# Patient Record
Sex: FEMALE | Race: WHITE | NOT HISPANIC OR LATINO | Employment: OTHER | URBAN - NONMETROPOLITAN AREA
[De-identification: names, ages, dates, MRNs, and addresses within clinical notes are randomized per-mention and may not be internally consistent; named-entity substitution may affect disease eponyms.]

---

## 2022-09-14 NOTE — PROGRESS NOTES
YOB: 1955  Location: [facility]  Location Address: [address] [place] [address], [facility], [address] [place] [address]  Location Phone: [phone]    Chief Complaint   Patient presents with   • Skin Lesion       History of Present Illness  Jane Choi is a 67 y.o. female.  Jane Choi is here for evaluation of ENT complaints. The patient has had problems with skin lesions to face.   She states the areas have been present for quite some time, but she feels as if they have not always been raised. She does not feel as if the areas have gotten any larger.   These have not been biopsied                  Past Medical History:   Diagnosis Date   • Ankle fracture, bimalleolar, closed, left, initial encounter        Past Surgical History:   Procedure Laterality Date   • KNEE MENISCAL REPAIR     • TONSILLECTOMY         Outpatient Medications Marked as Taking for the 9/15/22 encounter (Office Visit) with Rick Overton MD   Medication Sig Dispense Refill   • amLODIPine (NORVASC) 2.5 MG tablet      • betamethasone dipropionate 0.05 % cream      • busPIRone (BUSPAR) 15 MG tablet      • calcium carbonate (OS-JAEL) 600 MG tablet Take 600 mg by mouth Daily.     • docusate sodium (COLACE) 250 MG capsule Take 250 mg by mouth Daily.     • ergocalciferol (ERGOCALCIFEROL) 1.25 MG (68706 UT) capsule Take 50,000 Units by mouth.     • levothyroxine (SYNTHROID, LEVOTHROID) 112 MCG tablet      • lisinopril (PRINIVIL,ZESTRIL) 40 MG tablet      • metoprolol succinate XL (TOPROL-XL) 100 MG 24 hr tablet      • multivitamin (MULTI-VITAMIN DAILY PO) Take  by mouth Daily.     • mupirocin (BACTROBAN) 2 % ointment      • naproxen sodium (ANAPROX) 550 MG tablet      • oxybutynin XL (DITROPAN-XL) 5 MG 24 hr tablet      • sennosides-docusate (senna-docusate sodium) 8.6-50 MG per tablet Take 1 tablet by mouth Daily.     • simvastatin (ZOCOR) 40 MG tablet      • traZODone (DESYREL) 150 MG tablet          Patient has no known  allergies.    History reviewed. No pertinent family history.    Social History     Socioeconomic History   • Marital status: Single   Tobacco Use   • Smoking status: Current Some Day Smoker     Packs/day: 0.50     Years: 15.00     Pack years: 7.50     Types: Cigarettes     Start date: 2/19/2005     Last attempt to quit: 9/14/2022   • Smokeless tobacco: Never Used   • Tobacco comment: I am considering quitting smoking. Just have not yet.   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    HENT:        Admits skin lesion      Eyes: Negative.    Respiratory: Negative.    Cardiovascular: Negative.    Endocrine: Negative.    Genitourinary: Negative.    Musculoskeletal: Negative.        Vitals:    09/15/22 1053   BP: (!) 194/96   Pulse: 59   Resp: 16   Temp: 98 °F (36.7 °C)       Body mass index is 34.54 kg/m².    Objective     Physical Exam  Vitals reviewed.   Constitutional:       Appearance: Normal appearance. She is obese.   HENT:      Head: Normocephalic.     Neurological:      Mental Status: She is alert.         Assessment & Plan   Diagnoses and all orders for this visit:    1. Skin lesion (Primary)    2. Nevus    risks vs benefits of in office procedure discussed patient wishes to proceed     * Surgery not found *  No orders of the defined types were placed in this encounter.    Return in about 4 weeks (around 10/13/2022) for in office procedure .       Patient Instructions   Discussion of skin lesion. Discussed risks, benefits, alternatives, and possible complications of excision of the skin lesion with reconstruction utilizing local tissue rearrangement, full-thickness skin grafting, or local interpolated flaps. Risks include, but are not limited too: bleeding,  infection, hematoma, recurrence, need for additional procedures, flap failure, cosmetic deformity. Patient understands risks and would like to proceed with surgery.  Alternatives include doing nothing.

## 2022-09-15 ENCOUNTER — OFFICE VISIT (OUTPATIENT)
Dept: OTOLARYNGOLOGY | Facility: CLINIC | Age: 67
End: 2022-09-15

## 2022-09-15 VITALS
RESPIRATION RATE: 16 BRPM | WEIGHT: 195 LBS | BODY MASS INDEX: 34.55 KG/M2 | HEIGHT: 63 IN | DIASTOLIC BLOOD PRESSURE: 96 MMHG | TEMPERATURE: 98 F | HEART RATE: 59 BPM | SYSTOLIC BLOOD PRESSURE: 194 MMHG

## 2022-09-15 DIAGNOSIS — L98.9 SKIN LESION: Primary | ICD-10-CM

## 2022-09-15 DIAGNOSIS — D22.9 NEVUS: ICD-10-CM

## 2022-09-15 PROCEDURE — 99203 OFFICE O/P NEW LOW 30 MIN: CPT | Performed by: NURSE PRACTITIONER

## 2022-09-15 RX ORDER — DOCUSATE SODIUM 250 MG
250 CAPSULE ORAL DAILY
COMMUNITY

## 2022-09-15 RX ORDER — LISINOPRIL 40 MG/1
TABLET ORAL
COMMUNITY
Start: 2022-09-01

## 2022-09-15 RX ORDER — DIPHENOXYLATE HYDROCHLORIDE AND ATROPINE SULFATE 2.5; .025 MG/1; MG/1
TABLET ORAL DAILY
COMMUNITY

## 2022-09-15 RX ORDER — PHENOL 1.4 %
600 AEROSOL, SPRAY (ML) MUCOUS MEMBRANE DAILY
COMMUNITY

## 2022-09-15 RX ORDER — TRAZODONE HYDROCHLORIDE 150 MG/1
TABLET ORAL
COMMUNITY
Start: 2022-08-19

## 2022-09-15 RX ORDER — SIMVASTATIN 40 MG
TABLET ORAL
COMMUNITY
Start: 2022-09-01

## 2022-09-15 RX ORDER — BUSPIRONE HYDROCHLORIDE 15 MG/1
TABLET ORAL
COMMUNITY
Start: 2022-08-29

## 2022-09-15 RX ORDER — LEVOTHYROXINE SODIUM 112 UG/1
TABLET ORAL
COMMUNITY
Start: 2022-09-06

## 2022-09-15 RX ORDER — AMOXICILLIN 250 MG
1 CAPSULE ORAL DAILY
COMMUNITY

## 2022-09-15 RX ORDER — AMLODIPINE BESYLATE 2.5 MG/1
TABLET ORAL
COMMUNITY
Start: 2022-08-31

## 2022-09-15 RX ORDER — METOPROLOL SUCCINATE 100 MG/1
TABLET, EXTENDED RELEASE ORAL
COMMUNITY
Start: 2022-09-01

## 2022-09-15 RX ORDER — NAPROXEN SODIUM 550 MG/1
TABLET ORAL
COMMUNITY
Start: 2022-08-19

## 2022-09-15 RX ORDER — BETAMETHASONE DIPROPIONATE 0.5 MG/G
CREAM TOPICAL
COMMUNITY
Start: 2022-07-26

## 2022-09-15 RX ORDER — OXYBUTYNIN CHLORIDE 5 MG/1
TABLET, EXTENDED RELEASE ORAL
COMMUNITY
Start: 2022-08-19

## 2022-09-15 RX ORDER — ERGOCALCIFEROL 1.25 MG/1
50000 CAPSULE ORAL
COMMUNITY

## 2022-09-15 NOTE — PATIENT INSTRUCTIONS
Discussion of skin lesion. Discussed risks, benefits, alternatives, and possible complications of excision of the skin lesion with reconstruction utilizing local tissue rearrangement, full-thickness skin grafting, or local interpolated flaps. Risks include, but are not limited too: bleeding, infection, hematoma, recurrence, need for additional procedures, flap failure, cosmetic deformity. Patient understands risks and would like to proceed with surgery.  Alternatives include doing nothing.

## 2022-10-27 ENCOUNTER — PROCEDURE VISIT (OUTPATIENT)
Dept: OTOLARYNGOLOGY | Facility: CLINIC | Age: 67
End: 2022-10-27

## 2022-10-27 DIAGNOSIS — D48.9 NEOPLASM OF UNCERTAIN BEHAVIOR: Primary | ICD-10-CM

## 2022-10-27 PROCEDURE — 12051 INTMD RPR FACE/MM 2.5 CM/<: CPT | Performed by: OTOLARYNGOLOGY

## 2022-10-27 PROCEDURE — 11440 EXC FACE-MM B9+MARG 0.5 CM/<: CPT | Performed by: OTOLARYNGOLOGY

## 2022-10-27 NOTE — PROGRESS NOTES
PROCEDURE NOTE    Jane Choi    DATE OF PROCEDURE: 10/27/2022    PROCEDURE:   Excision of plasma uncertain behavior of the left upper lip and right chin with simple closure    PREPROCEDURE DIAGNOSIS:   Neoplasm of uncertain behavior of the left upper lip  Neoplasm uncertain behavior of the right chin    POSTPROCEDURE DIAGNOSIS:  SAME    ANESTHESIA:   Injected 1% Lidocaine with 1:100,000 parts epinephrine solution - 3 cc    PROCEDURE DESCRIPTION:    The patient was prepped and draped in the usual fashion.  Following the injection of local anesthesia circumferential elliptical excision was accomplished first of the lesion of the left upper lip without difficulty utilizing a #15 blade.  Excision was 1.6 x 0.5 cm.  The lesion was 0.3 x 0.2 cm.  The margin was 1 mm. Wide undermining was performed with curved iris scissors in order to facilitate closure and preserve normal anatomic relationships.  There was minimal to no bleeding.    Subsequently following injection of the right cheek and excisional biopsy was accomplished of the right chin lesion without difficulty utilizing a #15 blade.  Excision was 1.5 x 0.5 cm.  The lesion was 0.3 x 0.3 cm.  The margin was 1 mm. Wide undermining was performed with curved iris scissors in order to facilitate closure and preserve normal anatomic relationships.  There was minimal to no bleeding.    Both specimens were submitted in formalin for permanent pathologic examination.    Reapproximation of each lesion was accomplished with interrupted 5-0 nylon.    Bacitracin ointment was applied and the procedure terminated.  The patient tolerated the procedure well. There were no complications.

## 2022-10-27 NOTE — PATIENT INSTRUCTIONS
Call or return for problems  Wound care as instructed; clean 3 times a day with warm soapy water and apply ointment provided  Follow up in 10 days for suture removal

## 2022-11-08 ENCOUNTER — OFFICE VISIT (OUTPATIENT)
Dept: OTOLARYNGOLOGY | Facility: CLINIC | Age: 67
End: 2022-11-08

## 2022-11-08 DIAGNOSIS — D48.9 NEOPLASM OF UNCERTAIN BEHAVIOR: Primary | ICD-10-CM

## 2022-11-08 PROCEDURE — 99024 POSTOP FOLLOW-UP VISIT: CPT | Performed by: OTOLARYNGOLOGY

## 2022-11-14 ENCOUNTER — TELEPHONE (OUTPATIENT)
Dept: OTOLARYNGOLOGY | Facility: CLINIC | Age: 67
End: 2022-11-14